# Patient Record
Sex: FEMALE | Race: BLACK OR AFRICAN AMERICAN | Employment: UNEMPLOYED | URBAN - METROPOLITAN AREA
[De-identification: names, ages, dates, MRNs, and addresses within clinical notes are randomized per-mention and may not be internally consistent; named-entity substitution may affect disease eponyms.]

---

## 2022-09-17 VITALS
HEIGHT: 66 IN | TEMPERATURE: 97.9 F | BODY MASS INDEX: 32.47 KG/M2 | OXYGEN SATURATION: 96 % | WEIGHT: 202 LBS | DIASTOLIC BLOOD PRESSURE: 82 MMHG | RESPIRATION RATE: 14 BRPM | SYSTOLIC BLOOD PRESSURE: 167 MMHG | HEART RATE: 74 BPM

## 2022-09-17 PROCEDURE — 11730 AVULSION NAIL PLATE SIMPLE 1: CPT

## 2022-09-17 PROCEDURE — 99283 EMERGENCY DEPT VISIT LOW MDM: CPT

## 2022-09-17 ASSESSMENT — PAIN - FUNCTIONAL ASSESSMENT: PAIN_FUNCTIONAL_ASSESSMENT: NONE - DENIES PAIN

## 2022-09-18 ENCOUNTER — HOSPITAL ENCOUNTER (EMERGENCY)
Dept: GENERAL RADIOLOGY | Age: 62
Discharge: HOME | End: 2022-09-21

## 2022-09-18 ENCOUNTER — HOSPITAL ENCOUNTER (EMERGENCY)
Age: 62
Discharge: HOME | End: 2022-09-18
Attending: EMERGENCY MEDICINE

## 2022-09-18 DIAGNOSIS — S91.209A AVULSION OF TOENAIL, INITIAL ENCOUNTER: Primary | ICD-10-CM

## 2022-09-18 PROCEDURE — 11730 AVULSION NAIL PLATE SIMPLE 1: CPT

## 2022-09-18 PROCEDURE — 2500000003 HC RX 250 WO HCPCS: Performed by: EMERGENCY MEDICINE

## 2022-09-18 PROCEDURE — 73630 X-RAY EXAM OF FOOT: CPT

## 2022-09-18 RX ORDER — LIDOCAINE HYDROCHLORIDE 10 MG/ML
5 INJECTION, SOLUTION INFILTRATION; PERINEURAL ONCE
Status: COMPLETED | OUTPATIENT
Start: 2022-09-18 | End: 2022-09-18

## 2022-09-18 RX ADMIN — LIDOCAINE HYDROCHLORIDE 5 ML: 10 INJECTION, SOLUTION INFILTRATION; PERINEURAL at 02:19

## 2022-09-18 NOTE — ED NOTES
I have reviewed discharge instructions with the patient. The patient verbalized understanding. Patient left ED via Discharge Method: wheelchair to Home with daughter. Opportunity for questions and clarification provided. Patient given 0 scripts. To continue your aftercare when you leave the hospital, you may receive an automated call from our care team to check in on how you are doing. This is a free service and part of our promise to provide the best care and service to meet your aftercare needs.  If you have questions, or wish to unsubscribe from this service please call [phone]. Thank you for Choosing our [address] [place] [address] Emergency Department.           Subha Quiñones RN  09/18/22 7711

## 2022-09-18 NOTE — ED PROVIDER NOTES
Emergency Department Provider Note                   PCP:                None Provider               Age: 57 y.o. Sex: female       ICD-10-CM    1. Avulsion of toenail, initial encounter  S91.209A           DISPOSITION Decision To Discharge 09/18/2022 02:20:14 AM       MDM  Number of Diagnoses or Management Options  Avulsion of toenail, initial encounter  Diagnosis management comments: Patient had mostly dislocated toenail repair with good alignment after procedure. Amount and/or Complexity of Data Reviewed  Tests in the radiology section of CPT®: ordered and reviewed (XR FOOT LEFT (MIN 3 VIEWS)    Result Date: 9/18/2022  EXAM: Left foot x-rays. INDICATION: Pain, injury. COMPARISON: None. TECHNIQUE: 3 views. 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 There is no acute fracture or dislocation. The soft tissues are unremarkable. No acute process.   )         Orders Placed This Encounter   Procedures    XR FOOT LEFT (MIN 3 VIEWS)        Medications given in the Emergency Department:  Medications   lidocaine 1 % injection 5 mL (5 mLs IntraDERmal Given 9/18/22 0219)       New Prescriptions    No medications on file        Kurt Werner is a 57 y.o. female who presents to the Emergency Department with chief complaint of    Chief Complaint   Patient presents with    Toe Injury      Patient was out to dinner tonight and was eating and had her foot near a metal table. She states she accidentally slid her foot into the table and the table hit the top of her left great toenail. It partially remove the toenail and resulted in pain and bleeding. She states that as long as she is not touching it she is not hurting. But with any movement of it it does hurt. She had no other injuries. No fevers or chills. The history is provided by the patient.      All other systems reviewed and are negative unless otherwise stated in the History of Present Illness section. History reviewed. No pertinent past medical history. History reviewed. No pertinent surgical history. No family history on file. Social History     Socioeconomic History    Marital status:      Spouse name: None    Number of children: None    Years of education: None    Highest education level: None        Allergies: Patient has no known allergies. Previous Medications    No medications on file        Vitals signs and nursing note reviewed. ED Triage Vitals [09/17/22 2325]   Enc Vitals Group      BP (!) 167/82      Heart Rate 74      Resp 14      Temp 97.9 °F (36.6 °C)      Temp Source Oral      SpO2 96 %      Weight 202 lb (91.6 kg)      Height 5' 6\" (1.676 m)      Head Circumference       Peak Flow       Pain Score       Pain Loc       Pain Edu? Excl. in [address]? Physical Exam  Vitals and nursing note reviewed. Constitutional:       General: She is not in acute distress. Appearance: Normal appearance. She is not ill-appearing, toxic-appearing or diaphoretic. HENT:      Head: Normocephalic and atraumatic. Eyes:      General: No scleral icterus. Conjunctiva/sclera: Conjunctivae normal.   Cardiovascular:      Rate and Rhythm: Normal rate and regular rhythm. Pulmonary:      Effort: Pulmonary effort is normal. No respiratory distress. Breath sounds: No stridor. No wheezing, rhonchi or rales. Abdominal:      Palpations: Abdomen is soft. Tenderness: There is no abdominal tenderness. There is no guarding or rebound. Hernia: No hernia is present. Musculoskeletal:      Cervical back: Normal range of motion and neck supple. Skin:     Capillary Refill: Capillary refill takes less than 2 seconds. Comments: Left great toe has toenail that is turned inward and mostly detached. Part of the cuticle is still attached. No injury to the toe itself. Some bleeding from the nailbed base but no deep lacerations.    Neurological: General: No focal deficit present. Mental Status: She is alert and oriented to person, place, and time. Mental status is at baseline. Psychiatric:         Mood and Affect: Mood normal.         Behavior: Behavior normal.        Nail Removal    Date/Time: 9/18/2022 2:24 AM  Performed by: Naomie Bran MD  Authorized by: Naomie Bran MD     Consent:     Consent obtained:  Verbal    Consent given by:  Patient  Universal protocol:     Patient identity confirmed:  Verbally with patient  Location:     Foot:  L big toe  Pre-procedure details:     Skin preparation:  Povidone-iodine    Preparation: Patient was prepped and draped in the usual sterile fashion    Anesthesia:     Anesthesia method:  Local infiltration    Local anesthetic:  Lidocaine 1% w/o epi  Nail Removal:     Nail removal amount: Nail was displaced I did have to debride it and clean it. Removed nail replaced and anchored: yes (No was doing reimplanted underneath the cuticle with normal alignment. Secured with Dermabond as sutures were not helping.)    Post-procedure details:     Procedure completion:  Tolerated  Nerve Block    Date/Time: 9/18/2022 2:29 AM  Performed by: Naomie Bran MD  Authorized by: Naomie Bran MD     Consent:     Consent obtained:  Verbal    Consent given by:  Patient  Universal protocol:     Patient identity confirmed:  Verbally with patient  Indications:     Indications:  Procedural anesthesia  Location:     Body area:  Lower extremity    Lower extremity nerve blocked: left great toe digit block.     Laterality:  Left  Pre-procedure details:     Skin preparation:  Povidone-iodine    Preparation: Patient was prepped and draped in usual sterile fashion    Procedure details:     Block needle gauge:  21 G    Anesthetic injected:  Lidocaine 1% w/o epi    Injection procedure:  Anatomic landmarks identified, anatomic landmarks palpated, introduced needle, negative aspiration for blood and incremental injection  Post-procedure details:     Dressing:  None    Outcome:  Pain improved    Procedure completion:  Tolerated well, no immediate complications      Results Include:    No results found for this or any previous visit (from the past 24 hour(s)). XR FOOT LEFT (MIN 3 VIEWS)   Final Result   No acute process. Voice dictation software was used during the making of this note. This software is not perfect and grammatical and other typographical errors may be present. This note has not been completely proofread for errors.        Naomie Bran MD  09/18/22 2711       Naomie Bran MD  09/18/22 [id number]

## 2022-09-18 NOTE — ED TRIAGE NOTES
Per patient great toe injury 1 hour prior to arrival. States slid foot under chair and concrete. Toenail partially removed with controlled bleeding.